# Patient Record
(demographics unavailable — no encounter records)

---

## 2019-03-20 NOTE — CARD
MR#: [id number]

Account#: [id number]

Accession#: [id number]

Date of Study: 03/20/2019

Ordering Physician: REAGAN FRAIRE, 

Referring Physician: REAGAN FRAIRE 

Tech: Sheila Turner RDCS





--------------- APPROVED REPORT --------------





EXAM: Two-dimensional and M-mode echocardiogram with Doppler and color Doppler.



Other Information 

Quality : GoodHR: 72bpm

Rhythm : NSR



INDICATION

Hypertension/HCVD



2D DIMENSIONS 

RVDd2.5 (2.9-3.5cm)Left Atrium(2D)2.9 (1.6-4.0cm)

IVSd1.8 (0.7-1.1cm)Aortic Root(2D)3.6 (2.0-3.7cm)

LVDd4.0 (3.9-5.9cm)LVOT Diameter2.0 (1.8-2.4cm)

PWd1.0 (0.7-1.1cm)LVDs2.9 (2.5-4.0cm)

FS (%) 28.8 %SV39.3 ml

LVEF(%)55.9 (>50%)



M-Mode DIMENSIONS 

Left Atrium(MM)3.12 (2.5-4.0cm)Aortic Root3.42 (2.2-3.7cm)



Aortic Valve

AoV Peak Stan.104.4cm/sAoV VTI20.2cm

AO Peak GR.4.4mmHgLVOT Peak Stan.67.3cm/s

AO Mean GR.2mmHgAVA (VMAX)2.06cm2

NIRAJ   (VTI)2.10cm2



Mitral Valve

MV E Wlmrupzl35.7cm/sMV DECEL WMIA395cm

MV A Ixxfldeu17.7cm/sE/A  Ratio0.7

MV A Jvvbvctv16rq



Pulmonary Valve

PV Peak Loasmqcs559.8cm/s



Tricuspid Valve

TR P. Yprzdiso908sr/sRAP AFVRAPIX1jjOl

TR Peak Gr.62wsJmXGNJ16ezLe



 LEFT VENTRICLE 

The left ventricle is normal size. Proximal septal hypertrophy is noted. The left ventricular systoli
c function is normal and the ejection fraction is within normal range. The Ejection Fraction is 55-60
%. There is normal LV segmental wall motion. Transmitral Doppler flow pattern is Grade I-abnormal rel
axation pattern.



 RIGHT VENTRICLE 

The right ventricle is normal size. There is normal right ventricular wall thickness. The right ventr
icular systolic function is normal.



 ATRIA 

The left atrium size is normal. The right atrium size is normal. The interatrial septum is intact wit
h no evidence for an atrial septal defect or patent foramen ovale as noted on 2-D or Doppler imaging.




 AORTIC VALVE 

The aortic valve is calcified but opens well. The aortic valve is trileaflet. Doppler and Color Flow 
revealed no significant aortic regurgitation. There is no significant aortic valvular stenosis.



 MITRAL VALVE 

The mitral valve is thickened but opens well. There is no evidence of mitral valve prolapse. There is
 no mitral valve stenosis. Doppler and Color-flow revealed mild mitral regurgitation.



 TRICUSPID VALVE 

The tricuspid valve is normal in structure and function. Doppler and Color Flow revealed mild tricusp
id regurgitation. There is mild pulmonary hypertension. The PA pressure was estimated at 37 mmHg. The
re is no tricuspid valve prolapse or vegetation. There is no tricuspid valve stenosis.



 PULMONIC VALVE 

The pulmonic valve is not well visualized.



 GREAT VESSELS 

The aortic root is normal in size. The ascending aorta is normal in size. The IVC is normal in size a
nd collapses >50% with inspiration.



 PERICARDIAL EFFUSION 

There is no evidence of significant pericardial effusion.



Critical Notification

Critical Value: No



<Conclusion>

The left ventricular systolic function is normal and the ejection fraction is within normal range. Th
e Ejection Fraction is 55-60%.

Proximal septal hypertrophy is noted.

There is normal LV segmental wall motion.

Doppler and Color Flow revealed mild tricuspid regurgitation. There is mild pulmonary hypertension. T
he PA pressure was estimated at 37 mmHg.



Signed by : Anton Fu, 

Electronically Approved : 03/20/2019 09:06:32

## 2019-03-20 NOTE — RAD
MR#: [id number]

Account#: [id number]

Accession#: [id number]

Date of Study: 03/20/2019

Ordering Physician: REAGAN FRAIRE, 

Referring Physician: REAGAN FRAIRE, 

Tech: Wyatt Pepper MBA, RDMS, RVT, RDCS, RTR





--------------- APPROVED REPORT --------------





Patient Location: OUT-PATIENT



Indications

PAD



VELOCITY AND DOPPLER WAVEFORM ANALYSIS

RIGHT cm/secWaveformSeverity LEFT cm/secWaveform Severity 

dCFA 226.0BiphasicdCFA 197.0Biphasic

Prof Fem Art. 74.0BiphasicProf Fem Art. 62.0Biphasic

Fem Art Prox. 117.0BiphasicFem Art Prox. 126.0Biphasic

Fem Art Mid. 122.0BiphasicFem Art Mid. 111.0Biphasic

Fem Art Dist. 143.0BiphasicFem Art Dist. 92.0Biphasic

Pop Art([place]) 81.0BiphasicPop Art(AK) 100.0Biphasic

PTA Prox. 86.0BiphasicPTA Prox. 66.0Biphasic

PTA Dist. 69.0BiphasicPTA Dist. 68.0Biphasic

Per Art Mid. 67.0MonophasicPer Art Mid. 76.0Biphasic

AWAIS Prox. 150.0BiphasicATA Prox. 51.0Biphasic

DPA 35MonophasicDPA 30Monophasic



Findings

Grayscale images of the bilateral lower extremity arterial vessels reveals mild intimal hyperplasia w
ithout any focal obstructive plaque.



Mildly elevated bilateral common femoral arterial velocities in a biphasic wave pattern suggestive of
 mild disease but no focal obstruction. The bilateral superficial femoral, popliteal arteries are wit
hout any disease.



The bilateral below-knee vessels are patent with three-vessel runoff. Probable 50% stenosis involving
 the anterior tibial artery on the right side.



Critical Notification

Critical Value: No



<Conclusion>

1. No significant flow-limiting stenosis identified in the bilateral lower 70 arterial vessels.



Signed by : Anton Fu, 

Electronically Approved : 03/20/2019 11:38:56

## 2019-10-15 NOTE — KCIC
BILATERAL SCREENING MAMMOGRAM, 3-D

 

History: Routine screening.

 

Comparison:  Bilateral mammogram September 18, 2018 and 2017. 

 

Technique:  MLO and CC digital tomosynthesis (3D) images obtained. 

Radiologist reviewed these images on dedicated workstation.

 

Findings: 

Breast Tissue Density B : There are scattered areas of fibroglandular 

density.

There are numerous bilateral benign breast calcifications.

There are no dominant masses, suspicious microcalcifications, or 

architectural distortion.

 

IMPRESSION:

No mammographic evidence of malignancy. Recommend routine screening.

 

BI-RADS category 2:  Benign findings.

 

The images were reviewed with computer-aided detection.

 

Patient information is entered into reminder system with a target due date

for the next screening mammogram.

 

Mammography is the most sensitive method for finding small breast cancers,

but it does not detect them all and is not a substitute for careful 

clinical examination. A negative mammogram does not negate a clinically 

suspicious finding and should not result in delay in biopsying a 

clinically suspicious abnormality.

 

 "Our facility is accredited by the American College of [employer] 

Mammography Program."

 

Electronically signed by: Jerome Cruz MD (10/15/2019 7:02 PM) [facility]

## 2020-10-19 NOTE — KCIC
2 view study of the left calcaneus

 

Clinical indications: Left heel pain since March 2020. No known injury.

 

FINDINGS: No acute fracture is seen and Boehler's angle is maintained. No 

lytic process is evident. There is a moderate-sized plantar spur of the 

calcaneus. Calcific plantar fasciitis is evident. There is a small 

posterior spur of the calcaneus with adjacent calcification of the 

Achilles tendon consistent with calcific tendinitis.

 

IMPRESSION: No acute osseous abnormality. Calcaneal spurs as discussed 

above.

 

Electronically signed by: Mitchell Martinez MD (10/19/2020 4:02 PM) 

[id number]

## 2020-10-19 NOTE — KCIC
BILATERAL SCREENING MAMMOGRAM, 3-D

 

History: Routine screening.

 

Comparison:  Bilateral mammogram October 15, 2019. 

 

Technique:  MLO and CC digital tomosynthesis (3D) images obtained. 

Radiologist reviewed these images on dedicated workstation.

 

Findings: 

Breast Tissue Density B : There are scattered areas of fibroglandular 

density.

Arterial and nonarterial calcifications are redemonstrated bilaterally.

There are no dominant masses, suspicious microcalcifications, or 

architectural distortion.

 

IMPRESSION:

No mammographic evidence of malignancy. Recommend routine screening.

 

BI-RADS category 2:  Benign findings.

 

The images were reviewed with computer-aided detection.

 

Patient information is entered into reminder system with a target due date

for the next screening mammogram.

 

Mammography is the most sensitive method for finding small breast cancers,

but it does not detect them all and is not a substitute for careful 

clinical examination. A negative mammogram does not negate a clinically 

suspicious finding and should not result in delay in biopsying a 

clinically suspicious abnormality.

 

 "Our facility is accredited by the American College of [employer] 

Mammography Program."

 

Electronically signed by: Jerome Cruz MD (10/19/2020 7:32 PM) [facility]

## 2020-10-30 NOTE — RAD
MR#: [id number]

Account#: [id number]

Accession#: [id number]

Date of Study: 10/30/2020

Ordering Physician: REAGAN FRAIRE 

Referring Physician: ALIE TOMLINSON Tech: 





--------------- APPROVED REPORT --------------





Test Type:          Pharmacological

Stress Nurse/Tech: Varun TRUONG

Test Indications: HTN, Dyspnea

Cardiac History: HTN, See EMR

Medications:     See EMR

Medical History: x-smoker quit 2000, See EMR

Resting ECG:     SR w/ BBB

Resting Heart Rate: 64 bpm

Resting Blood Pressure: 187/71mmHg

Pretest Chest Pain: No chest pain



Nurse/Tech Notes

Lungs CTA, Heart tones regular

Consent: The procedure was explained to the patient in lay terms. Informed consent was witnessed. Murali
eout was entered into [employer] History and Stress Test performed by BRENDON Galeano



Pharm. Details

Pharmacologic stress testing was performed using 0.4mg per 5ml of regadenoson given intravenously ove
r 7-10 seconds.



Stress Symptoms

No chest pain or symptoms.



POST EXERCISE

Reason for Termination: Infusion complete

Max HR: 93 bpm

Max Blood Pressure: 162/67mmHg

Blood Pressure response to exercise: Normal blood pressure response during stress.

Heart Rate response to exercise: WNL

Chest Pain: No. 

Arrhythmia: No. 

ST Change: No. 



INTERPRETATION

Stress EKG Conclusion: Baseline EKG showed sinus rhythm with IVCD.  No ischemic changes at peak stres
s.  No arrhythmias.



Imaging Protocol

IMAGE PROTOCOL: Rest Tc-99m/stress Tc-99m 1 day



Rest:            Stress:         Viability:   

Radiopharm.Tc99m WltybpjunSp83v Sestamibi

Dose10.4mCi            32.5mCi            

Duration    13min.           13min.           

Img Date  10/30/2020      10/30/2020      

Inj-Img Jksq41bxi.           60min.           



Rest Admin Site:IV - Left AntecubitalAdministrator:RT Benjamin (R)(N)

Stress Admin Site: IV - Left AntecubitalAdministrator: BRENDON Galeano



STRESS DATA

End Diast. Vol.60.0mlLVEDV index BSA36.0ml

End Syst. Vol.14.0mlLVESV index BSA9.0ml

Myocardial Jywp951.0gEject. Butpnmbb17.0%



Stress Scores

Regional WT1.00Summed WT20.00

Regional WM0.00Summed WM1.00



Study quality was good.  Left Ventricular size was Normal at Rest and Stress.

Lung uptake was .  Left Ventricular ejection fraction is 77%.

The rest and stress images show normal perfusion, normal contraction and thickening.



LV Perf. Quant

17 Seg. SSS1.00

17 Seg. SRS3.00

17 Seg. SDS0.00

Stress Defect Extent (% LAD)0.00Rest Defect Extent (% LAD)13.10Rev. Defect Extent (% LAD)0.00

Stress Defect Extent (% LCX) 0.00Rest Defect Extent (% LCX)0.00Rev. Defect Extent (% LCX)0.00

Stress Defect Extent (% RCA)0.00Rest Defect Extent (% RCA)0.00Rev. Defect Extent (% RCA)0.00

Stress Defect Extent (% KOKI)0.00Rest Defect Extent (% KOKI)5.40Rev. Defect Extent (% KOKI)0.00



Conclusion

1. Regadenoson cardioisotope stress test did not show any evidence of ischemia or infarct.

2. Normal left ventricular systolic function with ejection fraction calculated at 77%.

3. Low risk for cardiac events.



Signed by : Reagan Fraire, 

Electronically Approved : 10/30/2020 14:29:44

## 2020-10-30 NOTE — CARD
MR#: [id number]

Account#: [id number]

Accession#: [id number]

Date of Study: 10/30/2020

Ordering Physician: REAGAN FRAIRE, 

Referring Physician: REAGAN FRAIRE 

Tech: Izabella Burr RDCS





--------------- APPROVED REPORT --------------





EXAM: Two-dimensional and M-mode echocardiogram with Doppler and color Doppler.



Other Information 

Quality : Good



INDICATION

Hypertension/HCVD

Dyspnea on Exertion



2D DIMENSIONS 

Left Atrium(2D)3.7 (1.6-4.0cm)IVSd0.9 (0.7-1.1cm)

Aortic Root(2D)3.1 (2.0-3.7cm)LVDd5.1 (3.9-5.9cm)

LVOT Diameter2.2 (1.8-2.4cm)PWd0.7 (0.7-1.1cm)

LVDs3.5 (2.5-4.0cm)FS (%) 31.8 %

SV72.9 mlLVEF(%)59.6 (>50%)



Aortic Valve

AoV Peak Stan.146.1cm/sAoV VTI28.5cm

AO Peak GR.8.5mmHgLVOT Peak Stan.137.4cm/s

AO Mean GR.5mmHgAVA (VMAX)3.52cm2

NIRAJ   (VTI)3.80cm2



Mitral Valve

MV E Uvgnamfj51.2cm/sMV DECEL REHI362qk

MV A Pueougeg51.6cm/sE/A  Ratio0.7



Tricuspid Valve

TR P. Axafldmz528ou/sRAP NCFIQZJP1zjCu

TR Peak Gr.97evDdTIYB86otAv



Pulmonary Vein

S1 Gyjuwswh81.3cm/sD2 Hwfibvmq05.6cm/s



 LEFT VENTRICLE 

The left ventricle is normal size. There is normal left ventricular wall thickness. The left ventricu
lar systolic function is normal. The Ejection Fraction is 55-60%. There is normal LV segmental wall m
otion. Transmitral Doppler flow pattern is Grade I-abnormal relaxation pattern.



 RIGHT VENTRICLE 

The right ventricle is normal size. The right ventricular systolic function is normal.



 ATRIA 

The left atrium size is normal. The right atrium size is normal. The interatrial septum is intact wit
h no evidence for an atrial septal defect or patent foramen ovale as noted on 2-D or Doppler imaging.




 AORTIC VALVE 

The aortic valve is calcified but opens well. Doppler and Color Flow revealed trace aortic regurgitat
ion. There is no significant aortic valvular stenosis.



 MITRAL VALVE 

The mitral valve is normal in structure and function. There is no evidence of mitral valve prolapse. 
There is no mitral valve stenosis. Doppler and Color-flow revealed trace mitral regurgitation.



 TRICUSPID VALVE 

The tricuspid valve is normal in structure and function. Doppler and Color Flow revealed mild tricusp
id regurgitation. There is mild pulmonary hypertension. The PA pressure was estimated at 37 mmHg. The
re is no tricuspid valve stenosis.



 PULMONIC VALVE 

The pulmonic valve is not well visualized. Doppler and Color Flow revealed no pulmonic valvular regur
gitation. There is no pulmonic valvular stenosis.



 GREAT VESSELS 

The aortic root is normal in size. The ascending aorta is normal in size. The IVC is normal in size a
nd collapses >50% with inspiration.



 PERICARDIAL EFFUSION 

There is no evidence of significant pericardial effusion.



Critical Notification

Critical Value: No



<Conclusion>

The left ventricular systolic function is normal.

The Ejection Fraction is 55-60%.

There is normal LV segmental wall motion.

Transmitral Doppler flow pattern is Grade I-abnormal relaxation pattern.

Trace mitral regurgitation.

Mild tricuspid regurgitation.

The PA pressure was estimated at 37 mmHg.

There is no evidence of significant pericardial effusion.



Signed by : Reagan Fraire, 

Electronically Approved : 10/30/2020 14:48:26

## 2021-11-03 NOTE — CARD
MR#: [id number]

Account#: [id number]

Accession#: [id number]

Date of Study: 11/03/2021

Ordering Physician: REAGAN FRAIRE, 

Referring Physician: REAGAN FRAIRE 

Tech: Cheli Nicholas [facility]





--------------- APPROVED REPORT --------------





EXAM: Two-dimensional and M-mode echocardiogram with Doppler and color Doppler.



Other Information 

Quality : AverageHR: 68bpm

Rhythm : NSR



INDICATION

Hypertension/HCVD



RISK FACTORS

Hypertension 



2D DIMENSIONS 

RVDd2.9 (2.9-3.5cm)Left Atrium(2D)4.5 (1.6-4.0cm)

IVSd1.2 (0.7-1.1cm)Aortic Root(2D)3.7 (2.0-3.7cm)

LVDd4.3 (3.9-5.9cm)LVOT Diameter2.0 (1.8-2.4cm)

PWd1.0 (0.7-1.1cm)LVDs2.8 (2.5-4.0cm)

FS (%) 35.9 %SV55.1 ml

LVEF(%)65.7 (>50%)



Aortic Valve

AoV Peak Stan.149.2cm/sAoV VTI33.0cm

AO Peak GR.8.9mmHgLVOT Peak Stan.108.3cm/s

AO Mean GR.5mmHgAVA (VMAX)2.33cm2



Mitral Valve

MV E Eejtzmuf69.5cm/sMV DECEL OJIG993ei

MV A Pymfnddw49.1cm/sE/A  Ratio0.7



Pulmonary Valve

PV Peak Kgumjbbq134.0cm/s



Tricuspid Valve

TR P. Frxghrzw861uy/sTR Peak Gr.31mmHg



 LEFT VENTRICLE 

The left ventricle is normal size. There is borderline concentric left ventricular hypertrophy. The l
eft ventricular systolic function is normal. Estimated ejection fraction 60-65%. There is normal LV s
egmental wall motion. Transmitral Doppler flow pattern is Grade I-abnormal relaxation pattern.



 RIGHT VENTRICLE 

The right ventricle is normal size. There is normal right ventricular wall thickness. The right ventr
icular systolic function is normal.



 ATRIA 

The left atrium size is normal. The right atrium size is normal. The interatrial septum is intact wit
h no evidence for an atrial septal defect or patent foramen ovale as noted on 2-D or Doppler imaging.




 AORTIC VALVE 

The aortic valve is normal in structure and function. Doppler and Color Flow revealed trace aortic re
gurgitation. There is no significant aortic valvular stenosis.



 MITRAL VALVE 

The mitral valve is normal in structure and function. There is no evidence of mitral valve prolapse. 
There is no mitral valve stenosis. Doppler and Color-flow revealed mild mitral regurgitation.



 TRICUSPID VALVE 

The tricuspid valve is normal in structure and function. Doppler and Color Flow revealed mild tricusp
id regurgitation. Estimated PAP 35 mmHg. There is no tricuspid valve stenosis.



 PULMONIC VALVE 

The pulmonary valve is normal in structure and function. Doppler and Color Flow revealed mild pulmoni
c valvular regurgitation.



 GREAT VESSELS 

The aortic root is mildly enlarged. The IVC is normal in size and collapses >50% with inspiration.



 PERICARDIAL EFFUSION 

There is no evidence of significant pericardial effusion.



Critical Notification

Critical Value: No



<Conclusion>

The left ventricular systolic function is normal.

Estimated ejection fraction 60-65%.

There is normal LV segmental wall motion.

Transmitral Doppler flow pattern is Grade I-abnormal relaxation pattern.

Mild mitral regurgitation.

Mild tricuspid regurgitation.  Estimated PAP 35 mmHg.

There is no evidence of significant pericardial effusion.



Signed by : Reagan Fraire, 

Electronically Approved : 11/03/2021 16:36:38

## 2021-11-04 NOTE — RAD
MR#: [id number]

Account#: [id number]

Accession#: [id number]

Date of Study: 11/03/2021

Ordering Physician: REAGAN KING, 

Referring Physician: REAGAN KING, 

Tech: Yuli Coy, REG, RVT, RTR





--------------- APPROVED REPORT --------------





Patient Location: OUT-PATIENT



Indications

PAD



VELOCITY AND DOPPLER WAVEFORM ANALYSIS

RIGHT cm/secWaveformSeverity LEFT cm/secWaveform Severity 

pCFA 219.8pCFA 203.9

Prof Fem Art. 214.0Prof Fem Art. 148.9

Fem Art Prox. 106.2Fem Art Prox. 113.4

Fem Art Mid. 98.9Fem Art Mid. 94.9

Fem Art Dist. 86.8Fem Art Dist. 66.9

Pop Art(AK) 59.1Pop Art(AK) 116.5

PTA Prox. 75.2PTA Prox. 90.1

PTA Dist. 30.6PTA Dist. 12.3

Per Art Prox. 82.9Per Art Prox. 62.8

AWAIS Prox. 23.1ATA Prox. 39.9

DPA 35DPA 55



Findings

Grayscale imaging of bilateral lower extremity peripheral artery showed moderate atherosclerotic plaq
ue.  Baker's cyst noted in the left popliteal fossa measuring 4.9 x 2.5 x 1.3 cm.  



Spectral waveform and color duplex imaging showed elevated peak systolic velocities in bilateral comm
on femoral artery suggesting greater than 70% stenosis.  There is three-vessel runoff below the knee 
bilaterally.  There is monophasic flow noted bilateral posterior tibial arteries suggesting at least 
moderate diffuse disease.  The left posterior tibial artery appears to be occluded distally.



Critical Notification

Critical Value: No



<Conclusion>

Bilateral lower extremity arterial duplex scan showed significant greater than 70% stenosis involving
 bilateral common femoral arteries.

Baker's cyst noted left popliteal fossa measuring 4.9 x 2.5 x 1.3 cm



Signed by : Regaan King, 

Electronically Approved : 11/04/2021 08:25:07

## 2021-11-19 NOTE — CARD
MR#: [id number]

Account#: [id number]

Accession#: [id number]

Date of Study: 11/19/2021

Ordering Physician: REAGAN KING, 

Referring Physician: REAGAN KING 

Tech: Madai Newman RT(R)





--------------- APPROVED REPORT --------------





Patient StatusOUT-PATIENT

Cardiac Technologist:      Madai Newman RT(R)

Procedure(s) performed: Aortogram with bilateral lower extremity runoff via right transradial approac
h



MODERATE SEDATION TIME: 33 MINUTES

FLUORO TIME: 7.5 MINS

DOSE: 27 GYCM2

CONTRAST: 61CC VISI



INDICATION FOR PROCEDURE

The indication(s) include : Peripheral artery disease with claudication and abnormal arterial duplex 
scan.



CASE TECHNIQUE

After explaining the risks, benefits, and alternative options, informed consent was obtained from the
 patient. IV conscious sedation was used throughout procedure with appropriate monitoring and was per
formed in the presence of a registered nurse who was an independent trained observer other than the blair montaño performing the procedure. During this case, Fluoroscopy and low osmolar contrast were used f
or imaging. Specimen(s) Removed: No Estimated Blood loss: 15 cc's.



PROCEDURE NARRATIVE

Patient was brought to the cardiac Cath Lab and her right wrist was prepped and draped in the usual f
ashion after confirming a positive modified Jens's test.  Vascular ultrasound guidance was used to o
btain right radial arterial access and a 6 [place] sheath was inserted.  A 4 [place]ian R2P PV multicurve 
catheter was then advanced under fluoroscopic guidance and with the tip positioned in the distal desc
ending aorta, aorto iliac angiography was performed.  The catheter was then advanced over the wire an
d with the tip positioned in the left external iliac artery, selective left lower extremity angiograp
hy was performed.  The catheter was withdrawn, advanced into the right external iliac artery over a g
uidewire and selective right lower extremity angiography was performed.  Patient tolerated the proced
ure well.  Hemostasis was achieved using TR band.  There were no immediate complications.



FINDINGS

1.  The distal descending aorta showed 20% stenosis.

2.  No significant stenosis involving bilateral common and external iliac arteries.

3.  No significant stenosis involving bilateral common femoral arteries.

4.  The right superficial femoral artery showed 30% stenosis in the distal segment.  The left superfi
cial femoral artery did not show any significant stenosis.  The deep femoral arteries bilaterally did
 not show any significant stenosis.

5.  The popliteal arteries bilaterally did not show any significant stenosis.

6.  There is good single-vessel runoff below the knee bilaterally via the peroneal artery.  The anter
ior tibial and posterior tibial arteries bilaterally showed chronic total occlusions proximally (desc
ribed in prior angiography).



Conclusion

Below the knee peripheral vascular disease as described above without any major vascular stenosis abo
ve the knee.  No significant lesions needing intervention were noted since patient does not have any 
nonhealing wounds.



Recommendations

Vascular risk factor modification and regular exercise regimen.



Signed by : Reagan King, 

Electronically Approved : 11/19/2021 10:05:29

## 2021-11-19 NOTE — NUR
Pt discharged to home with family. TR band removed and armboard reapplied. Pt ambulated and 
tolerated PO.

## 2021-11-19 NOTE — PDOC
MODERATE SEDATION ASSESSMENT


RISKS/ALTERNATIVES


Risks/Alternatives


Risks and alternatives of this type of sedation and procedure discussed with:


RISK/ALTERNATIVES:  Patient





H & P ON CHART


H & P


H & P on chart and reviewed for co-morbid conditions and appropriate labs.


H&P ON CHART:  Yes





PREGNANCY STATUS


PREG STATUS ASSESSED:  N/A





MEDS/ALLERGIES REVIEWED


Meds/Allergies Reviewed


Medications and Allergies including time and route of recently administered 

narcotics and sedatives.


MEDS/ALLERGIES REVIEWED:  Yes





ASA RATING


ASA RATING:  III





AIRWAY ASSESSMENT


Airway Assessment


Airway patency, oral function limitations, presence  of caps, crowns, dentures, 

partials, and ability to extend neck assessed.


AIRWAY ASSESSMENT:  Yes





MALLAMPATI SCORE


MALLAMPATI SCORE:  II





PRE-SEDATION ASSESSMENT


PRE-SEDATION ASSESSMENT:  Yes











REAGAN FRAIRE MD           Nov 19, 2021 09:35

## 2021-12-15 NOTE — [facility]
Bilateral digital screening mammograms with 3-D tomosynthesis:



Reason for examination: Routine screening.



Comparison is made to previous studies dated back to 8/3/2017.



Bilateral mammograms in CC and oblique projections were obtained with 2-D imaging and 3-D tomosynthes
is imaging on a Siemens Inspiration unit and reviewed on the workstation. Interpretation was made wit
h the benefit of CAD.



The skin and nipples show no abnormalities. No abnormal axillary lymph nodes are seen. The breast par
enchyma is predominantly fatty. (Breast density: Category A.) There are no dominant masses, suspiciou
s calcifications or architectural distortion. Benign calcifications are present.



Impression:



No evidence of malignancy. Recommend routine screening.



BI-RAD Category 2: Benign.



"Our facility is accredited by the [employer] Mammography Program."



This patient's information has been entered into a reminder system for the patient to be notified wit
h the results of her examination and a target date for the next mammogram.



Electronically signed by: Jami Garcia MD (12/15/2021 11:56 AM) [id number]

## 2022-01-17 NOTE — RAD
US VENOUS REFLUX



History: Reason: BILATERAL LEG EDEMA / Spl. Instructions:  / History: 



Comparison:  None.



Technique: Multiple longitudinal and transverse high resolution real-time images of the venous system
 of bilateral lower extremity were obtained with color and Doppler sampling. 



Findings: 

Right lower extremity: No occlusion. No significant reflux. Right greater saphenous vein measures 5.7
 mm tapering to 2.1 mm. Patent lesser saphenous vein.



Left lower extremity: No occlusion. No significant reflux. Right greater saphenous vein measures 6.1 
mm tapering to 2.7 mm. Patent left lesser saphenous vein. Left popliteal cyst measures 5.0 x 2.4 x 1.
6 cm.



Impression: 

1.  No ultrasound evidence of significant reflux.

2.  Small left popliteal cyst.



Electronically signed by: Kaiden Brandt DO (1/17/2022 10:32 AM) [id number]